# Patient Record
Sex: FEMALE | Race: WHITE | NOT HISPANIC OR LATINO | URBAN - METROPOLITAN AREA
[De-identification: names, ages, dates, MRNs, and addresses within clinical notes are randomized per-mention and may not be internally consistent; named-entity substitution may affect disease eponyms.]

---

## 2020-09-25 ENCOUNTER — IMPORTED ENCOUNTER (OUTPATIENT)
Dept: URBAN - METROPOLITAN AREA CLINIC 50 | Facility: CLINIC | Age: 65
End: 2020-09-25

## 2020-09-25 NOTE — PATIENT DISCUSSION
"""Type 2 diabetic eye exam with dilation. Mild diabetic retinopathy found. Macular edema is not present in the left eye. Patient instructed to call office immediately if sudden changes in vision occur. Emphasized importance of good blood glucose control.  Return in 1 year for dilated fundus exam. Summary of care provided to the physician managing the ongoing diabetes care."" ""OCT Macula: OD: Normal

## 2020-10-09 ENCOUNTER — IMPORTED ENCOUNTER (OUTPATIENT)
Dept: URBAN - METROPOLITAN AREA CLINIC 50 | Facility: CLINIC | Age: 65
End: 2020-10-09

## 2021-04-17 ASSESSMENT — VISUAL ACUITY
OD_CC: J1+-
OS_PH: 20/40-2
OS_CC: 20/50
OS_BAT: 20/60
OS_CC: J1+-
OD_OTHER: 20/50. 20/70.
OD_CC: J2@ 12 IN
OD_BAT: 20/50
OD_PH: 20/30+2
OD_CC: 20/20
OD_PH: @ 12 IN
OD_CC: 20/40-
OS_CC: 20/40
OS_PH: @ 12 IN
OS_CC: J2@ 12 IN

## 2021-04-17 ASSESSMENT — TONOMETRY
OS_IOP_MMHG: 14
OD_IOP_MMHG: 14
OD_IOP_MMHG: 14
OS_IOP_MMHG: 14

## 2023-12-01 ENCOUNTER — PREPPED CHART (OUTPATIENT)
Dept: URBAN - METROPOLITAN AREA CLINIC 52 | Facility: CLINIC | Age: 68
End: 2023-12-01

## 2024-03-13 ENCOUNTER — NEW PATIENT (OUTPATIENT)
Dept: URBAN - METROPOLITAN AREA CLINIC 52 | Facility: CLINIC | Age: 69
End: 2024-03-13

## 2024-03-13 DIAGNOSIS — H26.492: ICD-10-CM

## 2024-03-13 DIAGNOSIS — Z79.4: ICD-10-CM

## 2024-03-13 DIAGNOSIS — E11.9: ICD-10-CM

## 2024-03-13 PROCEDURE — 99204 OFFICE O/P NEW MOD 45 MIN: CPT

## 2024-03-13 PROCEDURE — 66821 AFTER CATARACT LASER SURGERY: CPT

## 2024-03-13 PROCEDURE — 92134 CPTRZ OPH DX IMG PST SGM RTA: CPT

## 2024-03-13 PROCEDURE — 92015 DETERMINE REFRACTIVE STATE: CPT

## 2024-03-13 ASSESSMENT — VISUAL ACUITY
OS_GLARE: 20/100
OS_CC: 20/70+2
OS_PH: 20/50+2
OS_GLARE: 20/100
OD_CC: 20/20
OU_CC: J1+ @ 18"

## 2024-03-13 ASSESSMENT — KERATOMETRY
OD_K1POWER_DIOPTERS: 44.00
OD_AXISANGLE2_DEGREES: 99
OS_K1POWER_DIOPTERS: 44.00
OD_K2POWER_DIOPTERS: 44.50
OS_AXISANGLE_DEGREES: 177
OS_K2POWER_DIOPTERS: 46.75
OD_AXISANGLE_DEGREES: 9
OS_AXISANGLE2_DEGREES: 87

## 2024-03-13 ASSESSMENT — TONOMETRY
OD_IOP_MMHG: 11
OS_IOP_MMHG: 11

## 2024-04-24 ENCOUNTER — POST-OP (OUTPATIENT)
Dept: URBAN - METROPOLITAN AREA CLINIC 52 | Facility: CLINIC | Age: 69
End: 2024-04-24

## 2024-04-24 DIAGNOSIS — Z98.890: ICD-10-CM

## 2024-04-24 PROCEDURE — 92015 DETERMINE REFRACTIVE STATE: CPT | Mod: NC,LT

## 2024-04-24 ASSESSMENT — KERATOMETRY
OD_K2POWER_DIOPTERS: 44.50
OS_K2POWER_DIOPTERS: 46.75
OD_AXISANGLE_DEGREES: 9
OS_K1POWER_DIOPTERS: 44.00
OD_AXISANGLE2_DEGREES: 99
OS_AXISANGLE2_DEGREES: 87
OD_K1POWER_DIOPTERS: 44.00
OS_AXISANGLE_DEGREES: 177

## 2024-04-24 ASSESSMENT — TONOMETRY
OS_IOP_MMHG: 12
OD_IOP_MMHG: 11

## 2024-04-24 ASSESSMENT — VISUAL ACUITY
OS_CC: 20/25
OU_CC: J1+
OD_CC: 20/20-1

## 2025-04-30 ENCOUNTER — COMPREHENSIVE EXAM (OUTPATIENT)
Age: 70
End: 2025-04-30

## 2025-04-30 DIAGNOSIS — E11.3292: ICD-10-CM

## 2025-04-30 DIAGNOSIS — H43.811: ICD-10-CM

## 2025-04-30 DIAGNOSIS — H16.223: ICD-10-CM

## 2025-04-30 DIAGNOSIS — H52.4: ICD-10-CM

## 2025-04-30 DIAGNOSIS — H35.361: ICD-10-CM

## 2025-04-30 DIAGNOSIS — H35.372: ICD-10-CM

## 2025-04-30 PROCEDURE — 99214 OFFICE O/P EST MOD 30 MIN: CPT

## 2025-04-30 PROCEDURE — 92015 DETERMINE REFRACTIVE STATE: CPT
